# Patient Record
Sex: FEMALE | Race: WHITE | NOT HISPANIC OR LATINO | Employment: OTHER | URBAN - METROPOLITAN AREA
[De-identification: names, ages, dates, MRNs, and addresses within clinical notes are randomized per-mention and may not be internally consistent; named-entity substitution may affect disease eponyms.]

---

## 2017-03-13 ENCOUNTER — TELEPHONE (OUTPATIENT)
Dept: RHEUMATOLOGY | Facility: CLINIC | Age: 47
End: 2017-03-13

## 2017-03-13 DIAGNOSIS — R53.82 CHRONIC FATIGUE: Primary | ICD-10-CM

## 2017-03-13 RX ORDER — AMITRIPTYLINE HYDROCHLORIDE 25 MG/1
25 TABLET, FILM COATED ORAL NIGHTLY
Qty: 90 TABLET | Refills: 3 | Status: SHIPPED | OUTPATIENT
Start: 2017-03-13 | End: 2018-03-13

## 2017-03-15 ENCOUNTER — TELEPHONE (OUTPATIENT)
Dept: RHEUMATOLOGY | Facility: CLINIC | Age: 47
End: 2017-03-15

## 2017-03-15 NOTE — TELEPHONE ENCOUNTER
Spoke with pt and advised her that the elavil was sent to her pharmacy on 3.13.17. Verbalized understanding.

## 2017-03-15 NOTE — TELEPHONE ENCOUNTER
----- Message from Yudith Mcgill sent at 3/15/2017 11:45 AM CDT -----  Contact: pt  Pt returning call concerning a prescription, pt can be reached at [phone]///[id number]